# Patient Record
Sex: FEMALE | NOT HISPANIC OR LATINO | URBAN - METROPOLITAN AREA
[De-identification: names, ages, dates, MRNs, and addresses within clinical notes are randomized per-mention and may not be internally consistent; named-entity substitution may affect disease eponyms.]

---

## 2018-03-14 ENCOUNTER — IMPORTED ENCOUNTER (OUTPATIENT)
Dept: URBAN - METROPOLITAN AREA CLINIC 31 | Facility: CLINIC | Age: 78
End: 2018-03-14

## 2018-03-14 PROBLEM — H35.3131: Noted: 2018-03-14

## 2018-03-14 PROBLEM — H43.812: Noted: 2018-03-14

## 2018-03-14 PROBLEM — H25.13: Noted: 2018-03-14

## 2018-03-14 PROCEDURE — 92014 COMPRE OPH EXAM EST PT 1/>: CPT

## 2018-03-14 PROCEDURE — 92015 DETERMINE REFRACTIVE STATE: CPT

## 2018-03-14 PROCEDURE — 92250 FUNDUS PHOTOGRAPHY W/I&R: CPT

## 2018-03-14 NOTE — PATIENT DISCUSSION
PVD OS: Patient was cautioned to call our office immediately if they experience a substantial change in their symptoms such as an increase in floaters persistent flashes loss of visual field (may appear as a shadow or a curtain) or decrease in visual acuity as these may indicate a retinal tear or detachment.   If this is a new problem patient will need to return for re-examination  as determined by the physician

## 2019-11-15 ENCOUNTER — IMPORTED ENCOUNTER (OUTPATIENT)
Dept: URBAN - METROPOLITAN AREA CLINIC 31 | Facility: CLINIC | Age: 79
End: 2019-11-15

## 2019-11-15 PROBLEM — H35.3131: Noted: 2019-11-15

## 2019-11-15 PROBLEM — H43.812: Noted: 2019-11-15

## 2019-11-15 PROBLEM — H25.13: Noted: 2019-11-15

## 2019-11-15 PROCEDURE — 92015 DETERMINE REFRACTIVE STATE: CPT

## 2019-11-15 PROCEDURE — 92250 FUNDUS PHOTOGRAPHY W/I&R: CPT

## 2019-11-15 PROCEDURE — 92014 COMPRE OPH EXAM EST PT 1/>: CPT

## 2019-11-15 NOTE — PATIENT DISCUSSION
1.  ARMD OU dry - Importance of smoking cessation blood pressure control and healthy diet were emphasized. In accordance with the AREDS study a good multivitamin containing EC and Zinc were recommened to be taken daily. Patient was instructed to self monitor their monocular vision (reading/Amsler Grid) at least weekly. Patient should immediately report any new onset of decreased vision or metamorphopsia. 2. Nuclear Sclerotic Cataract OU: Explained how cataracts can effect vision. Recommend clinical observation. The patient was advised to contact us if any change or worsening of vision. 3. PVD OS: Patient was cautioned to call our office immediately if they experience a substantial change in their symptoms such as an increase in floaters persistent flashes loss of visual field (may appear as a shadow or a curtain) or decrease in visual acuity as these may indicate a retinal tear or detachment.   If this is a new problem patient will need to return for re-examination  as determined by the physician

## 2021-01-08 ENCOUNTER — IMPORTED ENCOUNTER (OUTPATIENT)
Dept: URBAN - METROPOLITAN AREA CLINIC 31 | Facility: CLINIC | Age: 81
End: 2021-01-08

## 2021-01-08 PROBLEM — H10.403: Noted: 2021-01-08

## 2021-01-08 PROBLEM — H25.813: Noted: 2021-01-08

## 2021-01-08 PROCEDURE — 92012 INTRM OPH EXAM EST PATIENT: CPT

## 2021-01-08 NOTE — PATIENT DISCUSSION
1.  Allergic Conjunctivitis OU -- The condition was  discussed with the patient. Avoidance of allergens and cool compresses were recommended. Start pred ou qidStop using Wendy Curl. Combined Types of Cataract OU: Explained how cataracts can effect vision. Recommend clinical observation. The patient was advised to contact us if any change or worsening of vision. Return for an appointment in 1 week for office call. with Dr. Fiordaliza Florence.

## 2021-01-15 ENCOUNTER — IMPORTED ENCOUNTER (OUTPATIENT)
Dept: URBAN - METROPOLITAN AREA CLINIC 31 | Facility: CLINIC | Age: 81
End: 2021-01-15

## 2021-01-15 PROBLEM — H10.403: Noted: 2021-01-15

## 2021-01-15 PROBLEM — H25.813: Noted: 2021-01-15

## 2021-01-15 PROCEDURE — 99213 OFFICE O/P EST LOW 20 MIN: CPT

## 2021-01-15 NOTE — PATIENT DISCUSSION
1.  Allergic Conjunctivitis OU -- Improved. Stop pred. 2. Combined Types of Cataract OU: Explained how cataracts can effect vision. Recommend clinical observation. The patient was advised to contact us if any change or worsening of vision. Patient can call to schedule cat sx if desires w/ need bat at admit. Return for an appointment in 6 months for cataract evaluation. BAT. with Dr. Enedina Mancia.

## 2021-07-23 ENCOUNTER — IMPORTED ENCOUNTER (OUTPATIENT)
Dept: URBAN - METROPOLITAN AREA CLINIC 31 | Facility: CLINIC | Age: 81
End: 2021-07-23

## 2021-07-23 PROCEDURE — 92014 COMPRE OPH EXAM EST PT 1/>: CPT

## 2021-07-23 PROCEDURE — 92015 DETERMINE REFRACTIVE STATE: CPT

## 2021-09-09 ENCOUNTER — IMPORTED ENCOUNTER (OUTPATIENT)
Dept: URBAN - METROPOLITAN AREA CLINIC 31 | Facility: CLINIC | Age: 81
End: 2021-09-09

## 2021-09-09 PROBLEM — H25.812: Noted: 2021-09-09

## 2021-09-09 PROCEDURE — 92025 CPTRIZED CORNEAL TOPOGRAPHY: CPT

## 2021-09-09 PROCEDURE — 92136 OPHTHALMIC BIOMETRY: CPT

## 2021-09-09 NOTE — PATIENT DISCUSSION
Combined Types of Cataract OS: Discussed the risks benefits alternatives and limitations of cataract surgery including infection bleeding loss of vision retinal tears detachment. Refractive options were reviewed. The patient stated a full understanding and a desire to proceed with the procedure in the left eye. Patient has elected to be optimized for distance vision in the left eye. The patient will still need glasses for reading and to possibly fine tune distance vision. Toric IOL recommended - particularly OS - discussed

## 2021-09-28 ENCOUNTER — IMPORTED ENCOUNTER (OUTPATIENT)
Dept: URBAN - METROPOLITAN AREA CLINIC 31 | Facility: CLINIC | Age: 81
End: 2021-09-28

## 2021-09-28 PROBLEM — Z96.1: Noted: 2021-09-28

## 2021-09-28 PROCEDURE — 99024 POSTOP FOLLOW-UP VISIT: CPT

## 2021-09-28 NOTE — PATIENT DISCUSSION
Post-Op Day #1 -Cataract Surgery Left Eye (OS) s/p post capsular tear limited vitreous prolapse and anterior vitrectomy. Sulcus IOL. High IOP (38) Drop of Combigan and single oral Diamox 250 in office3 day IOP check in [facility]. . Dilate next time

## 2021-10-01 ENCOUNTER — IMPORTED ENCOUNTER (OUTPATIENT)
Dept: URBAN - METROPOLITAN AREA CLINIC 31 | Facility: CLINIC | Age: 81
End: 2021-10-01

## 2021-10-01 PROBLEM — Z96.1: Noted: 2021-10-01

## 2021-10-01 PROCEDURE — 99024 POSTOP FOLLOW-UP VISIT: CPT

## 2021-10-01 NOTE — PATIENT DISCUSSION
Post-Op Week #1 - Cataract Surgery Left Eye (OS) -  Intraocular lens stable and surgery very well healed. Patient to resume all normal activities. Finish postop drops as directed. Final Refraction given if necessary. Call with any problems. Return for an appointment for post op refraction. as scheduled. with Dr. Josette Buckley.

## 2021-10-05 ENCOUNTER — IMPORTED ENCOUNTER (OUTPATIENT)
Dept: URBAN - METROPOLITAN AREA CLINIC 31 | Facility: CLINIC | Age: 81
End: 2021-10-05

## 2021-10-05 PROBLEM — Z96.1: Noted: 2021-10-05

## 2021-10-05 PROCEDURE — 99024 POSTOP FOLLOW-UP VISIT: CPT

## 2021-10-05 NOTE — PATIENT DISCUSSION
Post-Op Week #1 - Cataract Surgery Left Eye (OS) -  Intraocular lens stable and surgery very well healed. Patient to resume all normal activities. Finish postop drops as directed. Final Refraction given if necessary. Call with any problems. Return for an appointment with Dr. Muna Tucker. as scheduled after cat sx in [facility] office.

## 2021-10-19 ENCOUNTER — IMPORTED ENCOUNTER (OUTPATIENT)
Dept: URBAN - METROPOLITAN AREA CLINIC 31 | Facility: CLINIC | Age: 81
End: 2021-10-19

## 2021-10-19 PROBLEM — Z96.1: Noted: 2021-10-19

## 2021-10-19 PROCEDURE — 99024 POSTOP FOLLOW-UP VISIT: CPT

## 2021-10-19 NOTE — PATIENT DISCUSSION
1.  Post-Op Day #1 - Cataract Surgery Right Eye (OD) - doing well. Tears prn. Continue postop drops as directed. Call office with symptoms of pain redness or decreased vision in operative eye. 1 drop tobramycin instilled. 2. Post-Op Cataract Surgery 15-90 days Left Eye (OS)-  Doing well with stable vision. Monitor for changes. One week Refract. Astigmatism OS.

## 2021-10-26 ENCOUNTER — IMPORTED ENCOUNTER (OUTPATIENT)
Dept: URBAN - METROPOLITAN AREA CLINIC 31 | Facility: CLINIC | Age: 81
End: 2021-10-26

## 2021-10-26 PROBLEM — Z96.1: Noted: 2021-10-26

## 2021-10-26 PROCEDURE — 99024 POSTOP FOLLOW-UP VISIT: CPT

## 2021-10-26 NOTE — PATIENT DISCUSSION
1.  Post-Op Week #1 - Cataract Surgery Right Eye (OD) - Intraocular lens stable and surgery very well healed. Patient to resume all normal activities. Finish postop drops as directed. Final Refraction given if necessary. Call with any problems. 2. Post-Op Cataract Surgery 15-90 days Left Eye (OS)-  Doing well with stable vision. Monitor for changes. OS rebound. PF BID for a week . then qd for a week. Katarina Ríos

## 2021-11-16 ENCOUNTER — IMPORTED ENCOUNTER (OUTPATIENT)
Dept: URBAN - METROPOLITAN AREA CLINIC 31 | Facility: CLINIC | Age: 81
End: 2021-11-16

## 2022-04-02 ASSESSMENT — VISUAL ACUITY
OS_CC: 20/80
OD_CC: 20/200
OS_SC: 20/70
OS_CC: J714''
OS_CC: 20/25
OS_PH: SC 20/50
OD_CC: 20/70-2
OS_SC: 20/40-1
OD_SC: 20/30-2
OD_SC: 20/25-2
OD_GLARE: 20/60MED
OS_SC: 20/30-2
OS_CC: 20/400
OD_SC: 20/25+2
OS_CC: J116''
OS_PH: SC 20/40
OS_CC: 20/50-3
OD_CC: J514''
OD_CC: 20/25
OD_PH: SC 20/30
OD_CC: 20/50+2
OD_CC: 20/40
OS_SC: 20/40
OD_CC: 20/100
OD_SC: 20/40
OS_PH: CC 20/50 +2
OD_SC: 20/30
OD_SC: 20/25
OD_PH: CC 20/25
OD_PH: SC 20/20 -2
OS_GLARE: 20/70MED
OU_CC: 20/2516''
OS_PH: SC 20/40
OS_CC: J514''
OS_SC: 20/30-2
OS_CC: 20/70
OD_CC: J214''
OD_SC: 20/30+2
OS_PH: CC 20/25 -2
OS_CC: 20/100
OS_CC: 20/100
OS_PH: SC 20/40 +2
OS_SC: 20/30-2
OD_CC: J1
OD_PH: SC 20/60
OS_PH: SC 20/40 +2

## 2022-04-02 ASSESSMENT — TONOMETRY
OD_IOP_MMHG: 14
OS_IOP_MMHG: 14
OD_IOP_MMHG: 15
OS_IOP_MMHG: 13
OS_IOP_MMHG: 16
OD_IOP_MMHG: 12
OS_IOP_MMHG: 15
OS_IOP_MMHG: 16
OD_IOP_MMHG: 20
OS_IOP_MMHG: 17
OS_IOP_MMHG: 42
OD_IOP_MMHG: 16
OS_IOP_MMHG: 12

## 2022-07-30 ENCOUNTER — TELEPHONE ENCOUNTER (OUTPATIENT)
Age: 82
End: 2022-07-30

## 2022-07-31 ENCOUNTER — TELEPHONE ENCOUNTER (OUTPATIENT)
Age: 82
End: 2022-07-31

## 2022-12-29 ENCOUNTER — PREPPED CHART (OUTPATIENT)
Dept: URBAN - METROPOLITAN AREA CLINIC 31 | Facility: CLINIC | Age: 82
End: 2022-12-29

## 2023-01-03 ENCOUNTER — ESTABLISHED PATIENT (OUTPATIENT)
Dept: URBAN - METROPOLITAN AREA CLINIC 31 | Facility: CLINIC | Age: 83
End: 2023-01-03

## 2023-01-03 DIAGNOSIS — H10.13: ICD-10-CM

## 2023-01-03 PROCEDURE — 99213 OFFICE O/P EST LOW 20 MIN: CPT

## 2023-01-03 ASSESSMENT — VISUAL ACUITY
OD_CC: 20/25
OS_CC: 20/40
OS_PH: 20/30

## 2024-07-23 ENCOUNTER — FOLLOW UP (OUTPATIENT)
Dept: URBAN - METROPOLITAN AREA CLINIC 31 | Facility: CLINIC | Age: 84
End: 2024-07-23

## 2024-07-23 DIAGNOSIS — T15.00XA: ICD-10-CM

## 2024-07-23 DIAGNOSIS — H04.123: ICD-10-CM

## 2024-07-23 PROCEDURE — 65222 REMOVE FOREIGN BODY FROM EYE: CPT

## 2024-07-23 PROCEDURE — 99213 OFFICE O/P EST LOW 20 MIN: CPT

## 2024-07-23 ASSESSMENT — VISUAL ACUITY
OD_CC: 20/30
OU_CC: 20/25
OS_CC: 20/30

## 2024-07-30 ENCOUNTER — FOLLOW UP (OUTPATIENT)
Dept: URBAN - METROPOLITAN AREA CLINIC 29 | Facility: CLINIC | Age: 84
End: 2024-07-30

## 2024-07-30 DIAGNOSIS — T15.00XA: ICD-10-CM

## 2024-07-30 DIAGNOSIS — H04.123: ICD-10-CM

## 2024-07-30 PROCEDURE — 99213 OFFICE O/P EST LOW 20 MIN: CPT

## 2024-07-30 ASSESSMENT — VISUAL ACUITY
OD_CC: 20/25-2
OS_CC: 20/30+2

## 2024-08-02 ENCOUNTER — FOLLOW UP (OUTPATIENT)
Dept: URBAN - METROPOLITAN AREA CLINIC 31 | Facility: CLINIC | Age: 84
End: 2024-08-02

## 2024-08-02 DIAGNOSIS — H04.123: ICD-10-CM

## 2024-08-02 DIAGNOSIS — T15.00XA: ICD-10-CM

## 2024-08-02 PROCEDURE — 99213 OFFICE O/P EST LOW 20 MIN: CPT

## 2024-08-02 ASSESSMENT — TONOMETRY
OS_IOP_MMHG: 15
OD_IOP_MMHG: 14

## 2024-08-02 ASSESSMENT — VISUAL ACUITY
OS_CC: 20/25-1
OD_CC: 20/25-2
OU_CC: 20/20-1

## 2024-12-18 ENCOUNTER — COMPREHENSIVE EXAM (OUTPATIENT)
Age: 84
End: 2024-12-18

## 2024-12-18 DIAGNOSIS — H04.123: ICD-10-CM

## 2024-12-18 DIAGNOSIS — H43.813: ICD-10-CM

## 2024-12-18 DIAGNOSIS — H52.4: ICD-10-CM

## 2024-12-18 DIAGNOSIS — H35.30: ICD-10-CM

## 2024-12-18 PROCEDURE — 92015 DETERMINE REFRACTIVE STATE: CPT

## 2024-12-18 PROCEDURE — 92014 COMPRE OPH EXAM EST PT 1/>: CPT

## 2024-12-18 PROCEDURE — 92250 FUNDUS PHOTOGRAPHY W/I&R: CPT
